# Patient Record
Sex: FEMALE | Race: WHITE | Employment: FULL TIME | URBAN - METROPOLITAN AREA
[De-identification: names, ages, dates, MRNs, and addresses within clinical notes are randomized per-mention and may not be internally consistent; named-entity substitution may affect disease eponyms.]

---

## 2022-04-12 ENCOUNTER — OFFICE VISIT (OUTPATIENT)
Dept: PRIMARY CARE CLINIC | Age: 32
End: 2022-04-12
Payer: COMMERCIAL

## 2022-04-12 VITALS
RESPIRATION RATE: 12 BRPM | BODY MASS INDEX: 25.23 KG/M2 | HEART RATE: 76 BPM | HEIGHT: 63 IN | OXYGEN SATURATION: 96 % | WEIGHT: 142.4 LBS | DIASTOLIC BLOOD PRESSURE: 72 MMHG | SYSTOLIC BLOOD PRESSURE: 100 MMHG

## 2022-04-12 DIAGNOSIS — E55.9 VITAMIN D DEFICIENCY: ICD-10-CM

## 2022-04-12 DIAGNOSIS — Z13.29 SCREENING FOR THYROID DISORDER: ICD-10-CM

## 2022-04-12 DIAGNOSIS — R14.0 ABDOMINAL BLOATING: ICD-10-CM

## 2022-04-12 DIAGNOSIS — Z76.89 ENCOUNTER TO ESTABLISH CARE: Primary | ICD-10-CM

## 2022-04-12 DIAGNOSIS — Z13.220 ENCOUNTER FOR LIPID SCREENING FOR CARDIOVASCULAR DISEASE: ICD-10-CM

## 2022-04-12 DIAGNOSIS — Z13.6 ENCOUNTER FOR LIPID SCREENING FOR CARDIOVASCULAR DISEASE: ICD-10-CM

## 2022-04-12 DIAGNOSIS — Z13.0 SCREENING FOR DEFICIENCY ANEMIA: ICD-10-CM

## 2022-04-12 DIAGNOSIS — Z12.4 CERVICAL CANCER SCREENING: ICD-10-CM

## 2022-04-12 DIAGNOSIS — E53.8 VITAMIN B 12 DEFICIENCY: ICD-10-CM

## 2022-04-12 PROCEDURE — 99203 OFFICE O/P NEW LOW 30 MIN: CPT | Performed by: NURSE PRACTITIONER

## 2022-04-12 RX ORDER — OMEPRAZOLE 40 MG/1
40 CAPSULE, DELAYED RELEASE ORAL
Qty: 90 CAPSULE | Refills: 1 | Status: SHIPPED | OUTPATIENT
Start: 2022-04-12 | End: 2022-05-10

## 2022-04-12 SDOH — ECONOMIC STABILITY: FOOD INSECURITY: WITHIN THE PAST 12 MONTHS, THE FOOD YOU BOUGHT JUST DIDN'T LAST AND YOU DIDN'T HAVE MONEY TO GET MORE.: NEVER TRUE

## 2022-04-12 SDOH — ECONOMIC STABILITY: FOOD INSECURITY: WITHIN THE PAST 12 MONTHS, YOU WORRIED THAT YOUR FOOD WOULD RUN OUT BEFORE YOU GOT MONEY TO BUY MORE.: NEVER TRUE

## 2022-04-12 ASSESSMENT — ENCOUNTER SYMPTOMS
VOMITING: 0
EYE ITCHING: 0
SINUS PRESSURE: 0
SHORTNESS OF BREATH: 0
ABDOMINAL PAIN: 0
COUGH: 0
EYE REDNESS: 0
SINUS PAIN: 0
SORE THROAT: 0
DIARRHEA: 0
TROUBLE SWALLOWING: 0
ABDOMINAL DISTENTION: 1
WHEEZING: 0
EYE DISCHARGE: 0
CHEST TIGHTNESS: 0
NAUSEA: 0

## 2022-04-12 ASSESSMENT — PATIENT HEALTH QUESTIONNAIRE - PHQ9
1. LITTLE INTEREST OR PLEASURE IN DOING THINGS: 0
SUM OF ALL RESPONSES TO PHQ9 QUESTIONS 1 & 2: 0
2. FEELING DOWN, DEPRESSED OR HOPELESS: 0
SUM OF ALL RESPONSES TO PHQ QUESTIONS 1-9: 0

## 2022-04-12 ASSESSMENT — SOCIAL DETERMINANTS OF HEALTH (SDOH): HOW HARD IS IT FOR YOU TO PAY FOR THE VERY BASICS LIKE FOOD, HOUSING, MEDICAL CARE, AND HEATING?: NOT HARD AT ALL

## 2022-04-12 NOTE — PROGRESS NOTES
[address] PRIMARY CARE  [address] [facility]  [address] [address]  Dept: [phone]  Dept Fax: [phone]    Celine Vincent is a 33 y.o. female who presents today for her medical conditions/complaintsas noted below. Celine Vincent is c/o of Establish Care and Bloated (noticed for a couple years)        HPI:     Pt presents to [facility] care. She has never had a pcp. bp stable  Weight is stable    Pt presents to establish care. She needs a gyn. No birth control. Cycle is regular. She is a . No children. Single. She has a dog, babatunde. C/o bloating that started a few years ago. She can not seem to figure it out. Sometimes its painful. Otherwise healthy. History reviewed. No pertinent past medical history. History reviewed. No pertinent surgical history. Family History   Problem Relation Age of Onset    Cancer Paternal Aunt        Social History     Tobacco Use    Smoking status: Never Smoker    Smokeless tobacco: Never Used   Substance Use Topics    Alcohol use: Not Currently      Current Outpatient Medications   Medication Sig Dispense Refill    omeprazole (PRILOSEC) 40 MG delayed release capsule Take 1 capsule by mouth every morning (before breakfast) 90 capsule 1     No current facility-administered medications for this visit.      Allergies   Allergen Reactions    Amoxicillin Nausea And Vomiting       Health Maintenance   Topic Date Due    Hepatitis C screen  Never done    Varicella vaccine (1 of 2 - 2-dose childhood series) Never done    HIV screen  Never done    Hepatitis B vaccine (3 of 3 - 3-dose primary series) 2008    Hepatitis A vaccine (2 of 2 - 2-dose series) 2009    DTaP/Tdap/Td vaccine (2 - Tdap) 07/10/2018    Cervical cancer screen  Never done    COVID-19 Vaccine (1) 2023 (Originally 1996)    Flu vaccine (Season Ended) 2022    Depression Screen  2023    Hib vaccine  Aged Out    Meningococcal (ACWY) vaccine  Aged Out    Pneumococcal 0-64 years Vaccine  Aged Out       :     Review of Systems   Constitutional: Negative for chills, fatigue and fever. HENT: Negative for ear discharge, ear pain, sinus pressure, sinus pain, sore throat and trouble swallowing. Eyes: Negative for discharge, redness and itching. Respiratory: Negative for cough, chest tightness, shortness of breath and wheezing. Cardiovascular: Negative for chest pain. Gastrointestinal: Positive for abdominal distention. Negative for abdominal pain, diarrhea, nausea and vomiting. Genitourinary: Negative for difficulty urinating. Musculoskeletal: Negative for arthralgias and neck pain. Skin: Negative for rash. Neurological: Negative for dizziness, weakness, light-headedness and headaches. All other systems reviewed and are negative. Objective:     Physical Exam  Constitutional:       Appearance: Normal appearance. She is normal weight. HENT:      Head: Normocephalic and atraumatic. Nose: Nose normal.   Eyes:      Extraocular Movements: Extraocular movements intact. Conjunctiva/sclera: Conjunctivae normal.      Pupils: Pupils are equal, round, and reactive to light. Cardiovascular:      Rate and Rhythm: Normal rate and regular rhythm. Pulses: Normal pulses. Heart sounds: Normal heart sounds. Pulmonary:      Effort: Pulmonary effort is normal.      Breath sounds: Normal breath sounds. Abdominal:      General: Abdomen is flat. Palpations: Abdomen is soft. Tenderness: There is abdominal tenderness. There is no guarding or rebound. Musculoskeletal:         General: Normal range of motion. Cervical back: Neck supple. Skin:     General: Skin is warm and dry. Capillary Refill: Capillary refill takes less than 2 seconds. Neurological:      General: No focal deficit present.       Mental Status: She is alert and oriented to person, place, and time.   Psychiatric:         Mood and Affect: Mood normal.       /72   Pulse 76   Resp 12   Ht 5' 2.75\" (1.594 m)   Wt 142 lb 6.4 oz (64.6 kg)   LMP 03/19/2022 (Exact Date)   SpO2 96%   Breastfeeding No   BMI 25.43 kg/m²     Assessment:       Diagnosis Orders   1. Encounter to establish care     2. Cervical cancer screening  [place], [place], [facility], [place]   3. Screening for deficiency anemia  CBC with Auto Differential   4. Screening for thyroid disorder  TSH with Reflex   5. Encounter for lipid screening for cardiovascular disease  Lipid Panel    Comprehensive Metabolic Panel   6. Vitamin B 12 deficiency  Vitamin B12 & Folate   7. Vitamin D deficiency  Vitamin D 25 Hydroxy   8. Abdominal bloating  Celiac Disease Panel    Food Comprehensive Panel       :      Return in about 1 month (around 5/12/2022) for physical.  1. Encounter to establish care  -reviewed previous records  2. Cervical cancer screening  -referral to [facility]  3. -7. Screening   Screening lab work ordered  8. Abdominal bloating  -rx for omeprazole 40mg daily    Follow up in one month for a physical   Orders Placed This Encounter   Procedures    CBC with Auto Differential     Standing Status:   Future     Standing Expiration Date:   4/12/2023    TSH with Reflex     Standing Status:   Future     Standing Expiration Date:   4/12/2023    Lipid Panel     Standing Status:   Future     Standing Expiration Date:   7/12/2022     Order Specific Question:   Is Patient Fasting?/# of Hours     Answer:    Fast 8-10 hours    Comprehensive Metabolic Panel     Standing Status:   Future     Standing Expiration Date:   4/12/2023    Vitamin B12 & Folate     Standing Status:   Future     Standing Expiration Date:   4/12/2023    Vitamin D 25 Hydroxy     Standing Status:   Future     Standing Expiration Date:   4/12/2023    Celiac Disease Panel     Standing Status:   Future     Standing Expiration Date:   4/12/2023   [facility] Comprehensive Panel     Standing Status:   Future     Standing Expiration Date:   4/12/2023   [facility] Posey, TATIANNA, Gynecology, [place]     Referral Priority:   Routine     Referral Type:   Eval and Treat     Referral Reason:   Specialty Services Required     Referred to Provider:   Zipporah Siemens, APRN - CNP     Requested Specialty:   Family Nurse Practitioner     Number of Visits Requested:   1     Orders Placed This Encounter   Medications    omeprazole (PRILOSEC) 40 MG delayed release capsule     Sig: Take 1 capsule by mouth every morning (before breakfast)     Dispense:  90 capsule     Refill:  1       Patient given educational materials - seepatient instructions. Discussed use, benefit, and side effects of prescribed medications. All patient questions answered. Pt voiced understanding. Reviewed health maintenance. Instructed to continue current medications, diet and exercise. Patient agreedwith treatment plan. Follow up as directed.       Electronically signed by KARLA Dai CNP on 4/12/2022at 4:15 PM

## 2022-05-10 ENCOUNTER — OFFICE VISIT (OUTPATIENT)
Dept: PRIMARY CARE CLINIC | Age: 32
End: 2022-05-10
Payer: COMMERCIAL

## 2022-05-10 VITALS
WEIGHT: 141 LBS | OXYGEN SATURATION: 99 % | HEART RATE: 86 BPM | HEIGHT: 63 IN | BODY MASS INDEX: 24.98 KG/M2 | SYSTOLIC BLOOD PRESSURE: 104 MMHG | DIASTOLIC BLOOD PRESSURE: 72 MMHG

## 2022-05-10 DIAGNOSIS — R14.0 ABDOMINAL BLOATING: ICD-10-CM

## 2022-05-10 DIAGNOSIS — E55.9 VITAMIN D DEFICIENCY: ICD-10-CM

## 2022-05-10 DIAGNOSIS — E53.8 VITAMIN B 12 DEFICIENCY: ICD-10-CM

## 2022-05-10 DIAGNOSIS — Z00.00 ENCOUNTER FOR WELL ADULT EXAM WITHOUT ABNORMAL FINDINGS: Primary | ICD-10-CM

## 2022-05-10 PROCEDURE — 99395 PREV VISIT EST AGE 18-39: CPT | Performed by: NURSE PRACTITIONER

## 2022-05-10 ASSESSMENT — PATIENT HEALTH QUESTIONNAIRE - PHQ9
SUM OF ALL RESPONSES TO PHQ QUESTIONS 1-9: 0
SUM OF ALL RESPONSES TO PHQ9 QUESTIONS 1 & 2: 0
1. LITTLE INTEREST OR PLEASURE IN DOING THINGS: 0
2. FEELING DOWN, DEPRESSED OR HOPELESS: 0
SUM OF ALL RESPONSES TO PHQ QUESTIONS 1-9: 0

## 2022-05-10 NOTE — LETTER
[facility] Primary Care  [address]  Phone: [phone]  Fax: [phone]    KARLA Kathleen CNP        May 10, 2022     Patient: Freddy Patten   YOB: 1990   Date of Visit: 5/10/2022       To Whom It May Concern: It is my medical opinion that Freddy Patten keep her dog as an emotional support animal to help with her mental health. .    If you have any questions or concerns, please don't hesitate to call.     Sincerely,        KARLA Kathleen CNP

## 2022-05-10 NOTE — PROGRESS NOTES
Well Adult Note  Name: Loly Ramos Date: 2022   MRN: [id number] Sex: Female   Age: 33 y.o. Ethnicity: Non- / Non    : 1990 Race: White (non-)      Andie Stapleton is here for well adult exam.  History:    Patient presents for her annual physical  Blood pressure stable  Weight is down 1 pound since last appointment    Patient presents for her annual physical.  Lab work was done prior to her visit. She continues to feel bloating despite omeprazole. She denies any change with taking the medication. She also would like to have a letter written for an emotional support animal    No other concerns today    She has an appointment with gynecology on May 18    Review of Systems   Constitutional: Negative for chills, fatigue and fever. HENT: Negative for ear discharge, ear pain, sinus pressure, sinus pain, sore throat and trouble swallowing. Eyes: Negative for discharge, redness and itching. Respiratory: Negative for cough, chest tightness, shortness of breath and wheezing. Cardiovascular: Negative for chest pain. Gastrointestinal: Positive for abdominal distention. Negative for abdominal pain, diarrhea, nausea and vomiting. Genitourinary: Negative for difficulty urinating. Musculoskeletal: Negative for arthralgias and neck pain. Skin: Negative for rash. Neurological: Negative for dizziness, weakness, light-headedness and headaches. All other systems reviewed and are negative. Allergies   Allergen Reactions    Amoxicillin Nausea And Vomiting         Prior to Visit Medications    Not on File       History reviewed. No pertinent past medical history. History reviewed. No pertinent surgical history.       Family History   Problem Relation Age of Onset    Cancer Paternal Aunt        Social History     Tobacco Use    Smoking status: Never Smoker    Smokeless tobacco: Never Used   Vaping Use    Vaping Use: Never used   Substance Use Topics    Alcohol use: Not Currently    Drug use: Never       Objective   /72   Pulse 86   Ht 5' 2.75\" (1.594 m)   Wt 141 lb (64 kg)   SpO2 99%   BMI 25.18 kg/m²   Wt Readings from Last 3 Encounters:   05/10/22 141 lb (64 kg)   04/12/22 142 lb 6.4 oz (64.6 kg)     There were no vitals filed for this visit. Physical Exam  Constitutional:       Appearance: Normal appearance. She is normal weight. HENT:      Head: Normocephalic and atraumatic. Nose: Nose normal.   Eyes:      Extraocular Movements: Extraocular movements intact. Conjunctiva/sclera: Conjunctivae normal.      Pupils: Pupils are equal, round, and reactive to light. Cardiovascular:      Rate and Rhythm: Normal rate and regular rhythm. Pulses: Normal pulses. Heart sounds: Normal heart sounds. Pulmonary:      Effort: Pulmonary effort is normal.      Breath sounds: Normal breath sounds. Abdominal:      General: Abdomen is flat. Palpations: Abdomen is soft. Musculoskeletal:         General: Normal range of motion. Cervical back: Neck supple. Skin:     General: Skin is warm and dry. Capillary Refill: Capillary refill takes less than 2 seconds. Neurological:      General: No focal deficit present. Mental Status: She is alert and oriented to person, place, and time. Psychiatric:         Mood and Affect: Mood normal.           Assessment   Plan   1. Encounter for well adult exam without abnormal findings  -Reviewed and discussed lab work  2. Abdominal bloating  -Stop omeprazole  -Discussed focusing on gut health. She is to look into doing an elimination diet to see if this helps. She is to continue to drink plenty of water and stay physically active  3. Vitamin B 12 deficiency  -Recommended either a multivitamin or a daily supplement  4. Vitamin D deficiency  -Recommend either multivitamin or daily supplement    She is to follow-up in 1 year or sooner as needed.   She is agreeable to this plan of care Personalized Preventive Plan   Current Health Maintenance Status  Immunization History   Administered Date(s) Administered    DTaP vaccine 07/10/2008    Hepatitis A Adult (Havrix, Vaqta) 07/17/2008    Hepatitis B Ped/Adol (Engerix-B, Recombivax HB) 06/10/2008, 07/10/2008    Influenza Virus Vaccine 12/09/2019    MMR 03/24/2004        Health Maintenance   Topic Date Due    Varicella vaccine (1 of 2 - 2-dose childhood series) Never done    HIV screen  Never done    Hepatitis B vaccine (3 of 3 - 3-dose primary series) 09/30/2008    Hepatitis A vaccine (2 of 2 - 2-dose series) 01/17/2009    Hepatitis C screen  Never done    DTaP/Tdap/Td vaccine (2 - Tdap) 07/10/2018    Cervical cancer screen  Never done    COVID-19 Vaccine (1) 04/12/2023 (Originally 4/5/1996)    Flu vaccine (Season Ended) 09/01/2022    Depression Screen  05/10/2023    Hib vaccine  Aged Out    Meningococcal (ACWY) vaccine  Aged Out    Pneumococcal 0-64 years Vaccine  Aged Out     Recommendations for Vinja Due: see orders and patient instructions/AVS.    Return in about 1 year (around 5/10/2023) for physical .

## 2022-05-11 PROBLEM — E53.8 VITAMIN B 12 DEFICIENCY: Status: ACTIVE | Noted: 2022-05-11

## 2022-05-11 PROBLEM — E55.9 VITAMIN D DEFICIENCY: Status: ACTIVE | Noted: 2022-05-11

## 2022-05-11 PROBLEM — R14.0 ABDOMINAL BLOATING: Status: ACTIVE | Noted: 2022-05-11

## 2022-05-11 ASSESSMENT — ENCOUNTER SYMPTOMS
CHEST TIGHTNESS: 0
SINUS PAIN: 0
ABDOMINAL DISTENTION: 1
EYE ITCHING: 0
WHEEZING: 0
EYE DISCHARGE: 0
ABDOMINAL PAIN: 0
VOMITING: 0
TROUBLE SWALLOWING: 0
EYE REDNESS: 0
NAUSEA: 0
SORE THROAT: 0
SINUS PRESSURE: 0
SHORTNESS OF BREATH: 0
COUGH: 0
DIARRHEA: 0

## 2022-06-28 ENCOUNTER — OFFICE VISIT (OUTPATIENT)
Dept: OBGYN CLINIC | Age: 32
End: 2022-06-28
Payer: COMMERCIAL

## 2022-06-28 VITALS — DIASTOLIC BLOOD PRESSURE: 62 MMHG | WEIGHT: 139 LBS | SYSTOLIC BLOOD PRESSURE: 116 MMHG | BODY MASS INDEX: 24.82 KG/M2

## 2022-06-28 DIAGNOSIS — Z01.419 VISIT FOR GYNECOLOGIC EXAMINATION: Primary | ICD-10-CM

## 2022-06-28 PROCEDURE — 99385 PREV VISIT NEW AGE 18-39: CPT | Performed by: NURSE PRACTITIONER

## 2022-06-28 ASSESSMENT — ENCOUNTER SYMPTOMS
VOMITING: 0
COLOR CHANGE: 0
COUGH: 0
ABDOMINAL PAIN: 0
SHORTNESS OF BREATH: 0
NAUSEA: 0

## 2022-06-28 NOTE — PROGRESS NOTES
Дмитрий Ortiz is a 33 y.o.  here for her annual exam.  The patient was seen and examined. The patients past medical, surgical, social and family history were reviewed. Current medications and allergies were reviewed, and documented in the chart. She is employed [employer]. G0    Tobacco abuse No    Last PAP: hx of abnormal PAP yes - May 2018 ASCUS, + HPV HR other ( that was her last PAP) [place] -CIN1,  she had previous colp in  CIN1/2- Had LEEP after this  Family hx uterine or ovarian cancer-denies   Family hx of breast cancer -paternal aunt  family hx colon cancer -denies        Sexually active: yes - for past year,  Dyspareunia: No, Vaginal discharge: no,  UTI symptoms: no, voiding difficulties: no, bowels regular:Yes bloating:no      Menstrual history:  Menarche age- 16, cycle every  month,  lasts 6 days. Birth control: NFP  LMP: 22    OB History    Para Term  AB Living   0 0 0 0 0 0   SAB IAB Ectopic Molar Multiple Live Births   0 0 0 0 0 0       Vitals:    22 0900   BP: 116/62   Site: Left Upper Arm   Position: Sitting   Cuff Size: Medium Adult   Weight: 139 lb (63 kg)       Wt Readings from Last 3 Encounters:   22 139 lb (63 kg)   05/10/22 141 lb (64 kg)   22 142 lb 6.4 oz (64.6 kg)     History reviewed. No pertinent past medical history.                                                                 Past Surgical History:   Procedure Laterality Date    LEEP       Family History   Problem Relation Age of Onset    Cancer Paternal Aunt     Breast Cancer Paternal Aunt      Social History     Tobacco Use   Smoking Status Never Smoker   Smokeless Tobacco Never Used     Social History     Substance and Sexual Activity   Alcohol Use Not Currently        Social History     Tobacco History     Smoking Status  Never Smoker    Smokeless Tobacco Use  Never Used          Alcohol History     Alcohol Use Status  Not Currently          Drug Use Drug Use Status  Never          Sexual Activity     Sexually Active  Yes Partners  Male Birth Control/Protection  None              Allergies   Allergen Reactions    Amoxicillin Nausea And Vomiting     No current outpatient medications on file. No current facility-administered medications for this visit. Subjective:     Review of Systems   Constitutional: Negative for chills and fever. Respiratory: Negative for cough and shortness of breath. Cardiovascular: Negative for chest pain, palpitations and leg swelling. Gastrointestinal: Negative for abdominal pain, nausea and vomiting. Genitourinary: Negative for dyspareunia, dysuria, flank pain, menstrual problem, pelvic pain, vaginal bleeding, vaginal discharge and vaginal pain. Skin: Negative for color change and rash. Neurological: Negative for dizziness, light-headedness and headaches. Hematological: Negative for adenopathy. Does not bruise/bleed easily. Psychiatric/Behavioral: Negative for self-injury and suicidal ideas. Objective:     Physical Exam  Vitals and nursing note reviewed. Constitutional:       General: She is not in acute distress. Appearance: She is well-developed. She is not diaphoretic. HENT:      Head: Normocephalic and atraumatic. Right Ear: External ear normal.      Left Ear: External ear normal.      Nose: Nose normal.   Eyes:      Pupils: Pupils are equal, round, and reactive to light. Neck:      Thyroid: No thyromegaly. Cardiovascular:      Rate and Rhythm: Normal rate and regular rhythm. Heart sounds: Normal heart sounds. No murmur heard. No friction rub. No gallop. Comments: No bilateral calf tenderness or swelling  Pulmonary:      Effort: Pulmonary effort is normal. No respiratory distress. Breath sounds: Normal breath sounds. No wheezing. Abdominal:      General: Bowel sounds are normal.      Palpations: Abdomen is soft. Tenderness:  There is no abdominal tenderness. Genitourinary:     Comments: Breasts nipples everted, no masses or tenderness, does BSE  Vulva-no lesions  Vagina-pink rugated  Cervix-firm, 2 cm. Nontender, freely movable, no lesions  Uterus-ant. Smooth, firm, nontender, freely movable  Adnexa-no masses or tenderness   Musculoskeletal:         General: Normal range of motion. Cervical back: Normal range of motion and neck supple. Lymphadenopathy:      Cervical: No cervical adenopathy. Skin:     General: Skin is warm and dry. Findings: No rash. Neurological:      Mental Status: She is alert and oriented to person, place, and time. Cranial Nerves: No cranial nerve deficit. Deep Tendon Reflexes: Reflexes are normal and symmetric. Psychiatric:         Behavior: Behavior normal.         Thought Content: Thought content normal.         Judgment: Judgment normal.       /62 (Site: Left Upper Arm, Position: Sitting, Cuff Size: Medium Adult)   Wt 139 lb (63 kg)   LMP 06/19/2022   Breastfeeding No   BMI 24.82 kg/m²     Assessment:       Diagnosis Orders   1. Visit for gynecologic examination  PAP SMEAR       Breast exam completed. Pelvic exam pap smear collected and sent. Cultures sent No    Plan:   Collect pap   BSE reviewed, Mammogram ordered: no  STD prevention reviewed  Gardisil counseling completed for all patients 10-37 yo  Cultures declined     Diet & Exercise reviewed with pt. Preventive  Health through PCP   RV prn/annual           Orders Placed This Encounter   Procedures    PAP SMEAR     Standing Status:   Future     Standing Expiration Date:   6/28/2023     Order Specific Question:   Collection Type     Answer: Thin Prep     Order Specific Question:   Prior Abnormal Pap Test     Answer:   No     Order Specific Question:   Screening or Diagnostic     Answer:   Screening     Order Specific Question:   HPV Requested?      Answer:   Yes     Order Specific Question:   High Risk Patient     Answer:   N/A     No orders of the defined types were placed in this encounter. Patient given educational materials - seepatient instructions. Discussed use, benefit, and side effects of prescribed medications. All patient questions answered. Pt voiced understanding. Reviewed health maintenance. Instructed to continue current medications, diet and exercise. Patient agreedwith treatment plan. Follow up as directed.       Electronically signed by KARLA Boss CNP on 6/28/2022at 9:51 AM

## 2022-06-28 NOTE — PATIENT INSTRUCTIONS
Patient Education        Pap Test: Care Instructions  Overview     The Pap test (also called a Pap smear) is a screening test for cancer of the cervix, which is the lower part of the uterus that opens into the vagina. The test can help your doctor find early changes in the cells that could lead tocancer. The sample of cells taken during your test has been sent to a lab so that an expert can look at the cells. It usually takes a week or two to get the resultsback. Follow-up care is a key part of your treatment and safety. Be sure to make and go to all appointments, and call your doctor if you are having problems. It's also a good idea to know your test results and keep alist of the medicines you take. What do the results mean?  A normal result means that the test did not find any abnormal cells in the sample.  An abnormal result can mean many things. Most of these are not cancer. The results of your test may be abnormal because:  ? You have an infection of the vagina or cervix, such as a yeast infection. ? You have low estrogen levels after menopause that are causing the cells to change. ? You have cell changes that may be a sign of precancer or cancer. The results are ranked based on how serious the changes might be. If the results were abnormal, you may need to have other tests. If the results show changes that could be a sign of cancer, you may need a test called acolposcopy, which provides a more complete view of the cervix. Sometimes the lab cannot use the sample because it does not contain enough cells or was not preserved well. If so, you may need to have the test again. This is not common, but it does happen from time to time. When should you call for help?   Watch closely for changes in your health, and be sure to contact your doctor if:     You have vaginal bleeding or pain for more than 2 days after the test. It is normal to have a small amount of bleeding for a day or two after the test. Where can you learn more? Go to https://chpepiceweb.healthIM-Sense. org and sign in to your Bringme account. Enter G998 in the deviantART box to learn more about \"Pap Test: Care Instructions. \"     If you do not have an account, please click on the \"Sign Up Now\" link. Current as of: September 8, 2021               Content Version: 13.3  © 6492-3546 Healthwise, Incorporated. Care instructions adapted under license by [facility] ([facility]). If you have questions about a medical condition or this instruction, always ask your healthcare professional. [address] any warranty or liability for your use of this information.

## 2022-07-18 DIAGNOSIS — Z01.419 VISIT FOR GYNECOLOGIC EXAMINATION: ICD-10-CM

## 2023-03-07 ENCOUNTER — OFFICE VISIT (OUTPATIENT)
Dept: OBGYN CLINIC | Age: 33
End: 2023-03-07
Payer: COMMERCIAL

## 2023-03-07 ENCOUNTER — HOSPITAL ENCOUNTER (OUTPATIENT)
Age: 33
Setting detail: SPECIMEN
Discharge: HOME | End: 2023-03-07

## 2023-03-07 VITALS
WEIGHT: 130 LBS | BODY MASS INDEX: 23.04 KG/M2 | DIASTOLIC BLOOD PRESSURE: 70 MMHG | SYSTOLIC BLOOD PRESSURE: 110 MMHG | HEIGHT: 63 IN

## 2023-03-07 DIAGNOSIS — L70.0 ACNE VULGARIS: ICD-10-CM

## 2023-03-07 DIAGNOSIS — N92.3 INTERMENSTRUAL BLEEDING: ICD-10-CM

## 2023-03-07 DIAGNOSIS — L68.0 HIRSUTISM: ICD-10-CM

## 2023-03-07 DIAGNOSIS — N94.6 DYSMENORRHEA: Primary | ICD-10-CM

## 2023-03-07 LAB
CANDIDA SPECIES, DNA PROBE: NEGATIVE
GARDNERELLA VAGINALIS, DNA PROBE: NEGATIVE
SOURCE: NORMAL
TRICHOMONAS VAGINALIS DNA: NEGATIVE

## 2023-03-07 PROCEDURE — 99214 OFFICE O/P EST MOD 30 MIN: CPT | Performed by: ADVANCED PRACTICE MIDWIFE

## 2023-03-07 ASSESSMENT — ENCOUNTER SYMPTOMS
DIARRHEA: 0
SHORTNESS OF BREATH: 0
ABDOMINAL PAIN: 0
NAUSEA: 0
VOMITING: 0

## 2023-03-07 ASSESSMENT — PATIENT HEALTH QUESTIONNAIRE - PHQ9
1. LITTLE INTEREST OR PLEASURE IN DOING THINGS: 0
SUM OF ALL RESPONSES TO PHQ QUESTIONS 1-9: 0
2. FEELING DOWN, DEPRESSED OR HOPELESS: 0
SUM OF ALL RESPONSES TO PHQ9 QUESTIONS 1 & 2: 0
SUM OF ALL RESPONSES TO PHQ QUESTIONS 1-9: 0

## 2023-03-07 NOTE — PROGRESS NOTES
[address] OB/GYN [facility]  [place]  [address] [address]  Dept: [phone]    Patient Name: Mardel Bence  Patient Age: 33 y.o. Date of Visit: 3/7/2023    Subjective  Chief Complaint   Patient presents with    Other     Possible PCOS- excess pain in lower back, cramping, spotting on and off, acne, facial hair    Got off of BC a couple years ago  Symtpoms started appearing worse over the last 5-6 months     Patient's last menstrual period was 2023. Chaperone for Intimate Exam  Chaperone was offered as part of the rooming process. Patient declined and agrees to continue with exam without a chaperone. Chaperone: n/a      History of Present Illness:    Patient arrive with menstrual concerns including new onset pain and intermenstrual bleeding   Patient reports hirsutism. Patient reports acne. Patient denies alopecia  Patient denies acanthosis nigricans     Began having periods at age 16 have them every month. Age [age] until the age of 30 was on oral contraception. Went on birth control at age of [age] due to acne. When she initially stopped taking her birth control her periods remained monthly and were not painful. About a year after she stopped she started to notice more headache, cramping, hormonal acne, hair growth in face. More recently pain first day or two of period almost 'worthless' can't do anything and then spotting intermenstrual    Cycles around 33 days    Chronic health conditions no    Sexually with men (one) using natural family planning, condoms, pull out     No history of STDs    OB History          0    Para   0    Term   0       0    AB   0    Living   0         SAB   0    IAB   0    Ectopic   0    Molar   0    Multiple   0    Live Births   0              History reviewed. No pertinent past medical history. No current outpatient medications on file.      No current facility-administered medications for this visit. Review of Systems   Constitutional:  Negative for unexpected weight change. Respiratory:  Negative for shortness of breath. Cardiovascular:  Negative for chest pain, palpitations and leg swelling. Gastrointestinal:  Negative for abdominal pain, diarrhea, nausea and vomiting. Genitourinary:  Positive for menstrual problem and vaginal discharge. Negative for difficulty urinating, dyspareunia and vaginal pain. Neurological:  Negative for dizziness, light-headedness and headaches. Objective  /70 (Site: Right Upper Arm, Position: Sitting, Cuff Size: Small Adult)   Ht 5' 3\" (1.6 m)   Wt 130 lb (59 kg)   LMP 02/17/2023   BMI 23.03 kg/m²   Body mass index is 23.03 kg/m². PCOS Lab review:   Bioavailable and free testosterone: Discussed, Ordered  17 hydroxyprogesterone: Discussed, Ordered  TSH:Discussed, Ordered  Prolactin:Discussed, Ordered  Fasting Lipids:Discussed, Ordered  Transvaginal/pelvic ultrasound:Discussed, Ordered  Fasting insulin:Discussed, Ordered    Waist Circumference (>35in abnormal): no    [place] Consensus Criteria 2003   1. Hyperandrogenism: Yes   2. Oligomenorrhea or Amenorrhea: No   3. Polycystic ovaries by ultrasound diagnosis: No    Physical Exam  Vitals reviewed. Constitutional:       General: She is not in acute distress. Appearance: She is well-developed. She is not diaphoretic. Neck:      Thyroid: No thyromegaly or thyroid tenderness. Cardiovascular:      Rate and Rhythm: Normal rate and regular rhythm. Heart sounds: Normal heart sounds. Pulmonary:      Effort: Pulmonary effort is normal. No respiratory distress. Breath sounds: Normal breath sounds. Abdominal:      General: There is no distension. Palpations: Abdomen is soft. Tenderness: There is no abdominal tenderness. Genitourinary:     Labia:         Right: No rash, tenderness or lesion. Left: No rash, tenderness or lesion.        Vagina: No signs of injury. Vaginal discharge present. No tenderness. Cervix: Friability present. No cervical motion tenderness or lesion. Musculoskeletal:         General: Normal range of motion. Cervical back: Normal range of motion. Skin:     General: Skin is warm and dry. Neurological:      Mental Status: She is alert and oriented to person, place, and time. Mental status is at baseline. Psychiatric:         Behavior: Behavior normal.         Thought Content: Thought content normal.         Judgment: Judgment normal.         Assessment & Plan  1. Dysmenorrhea  Discussed patient's menstrual history in depth, reviewed based on age of menses and prolonged period of time on combined oral contraception dysmenorrhea could not be a new finding but hard to deduce because combined oral contraception aids in painful menstrual cycles. - Reviewed comfort measures including heat/ice/NSAID  - Reviewed expectant management verus medical management. Would like to obtained labs/imaging and then will decide management plan  - Testosterone Free and Total, Non-Male; Future  - 17-Hydroxyprogesterone; Future  - TSH; Future  - Prolactin; Future  - Hemoglobin A1C; Future  - Insulin, total; Future  - Lipid Panel; Future  - US PELVIS COMPLETE NON-OB TRANSABDOMINAL AND TRANSVAGINAL; Future    2. Acne vulgaris  - Discussed combined oral contraception can help with acne so being on birth control could have masked this symptoms.   - Testosterone Free and Total, Non-Male; Future  - 17-Hydroxyprogesterone; Future  - TSH; Future  - Prolactin; Future  - Hemoglobin A1C; Future  - Insulin, total; Future  - Lipid Panel; Future  - US PELVIS COMPLETE NON-OB TRANSABDOMINAL AND TRANSVAGINAL; Future    3. Hirsutism  - Testosterone Free and Total, Non-Male; Future  - 17-Hydroxyprogesterone; Future  - TSH; Future  - Prolactin; Future  - Hemoglobin A1C; Future  - Insulin, total; Future  - Lipid Panel;  Future  - US PELVIS COMPLETE NON-OB TRANSABDOMINAL AND TRANSVAGINAL; Future    4. Intermenstrual bleeding  - Discussed etiology. NO structural causes on exam noted   - Testosterone Free and Total, Non-Male; Future  - 17-Hydroxyprogesterone; Future  - TSH; Future  - Prolactin; Future  - Hemoglobin A1C; Future  - Insulin, total; Future  - Lipid Panel; Future  - US PELVIS COMPLETE NON-OB TRANSABDOMINAL AND TRANSVAGINAL; Future  - Vaginitis DNA Probe; Future  - C.trachomatis N.gonorrhoeae DNA; Future    - Reviewed with Ankur Farmer this is no universally accepted definition of polycystic ovary syndrome.  - Reviewed [place] Consensus Criteria 2003 (two out of three required for diagnosis) is used by provider.  - Reviewed hyperandrogenism is diagnosed by the presence of hirsutism, acne and/or biochemical hyperandrogenemia. - Reviewed that polycystic ovaries alone are a very nonspecific findings and as an isolated finding is not diagnostic for polycystic ovarian syndrome.  - Reviewed that women with polycystic ovarian syndrome are at increased risk of insulin resistance, metabolic syndrome, nonalcoholic fatty liver disease, and obesity-related disorders including sleep apnea. - Discussed with Ankur Farmer that women with polycystic ovarian syndrome have multiple risk factors for endometrial cancer including chronic anovulation, centripetal obesity, and diabetes. - Discussed that women with polycystic ovarian syndrome has anywhere from two to fivefold increased risk of diabetes. Discussed recommendations for screening for type 2 diabetes including 2-hour glucola. Discussed that fasting glucose levels alone are poor predictors of glucose intolerance risk in women with polycystic ovarian syndrome.   - Discussed women with polycystic ovarian syndrome have an increased risk of cardiovascular disease. Reviewed recommendation for monitoring BMI and fasting lipids.   - Reviewed that regular exercise and weight control are proven to reduce mortality and morbidity.     Reviewed based on current symptoms and history would NOT meet criteria for PCOS. Need ultrasound to further evaluate     Return if symptoms worsen or fail to improve.     Electronically Signed KARLA Damian CNM

## 2023-03-08 LAB
C TRACH DNA SPEC QL PROBE+SIG AMP: NEGATIVE
N GONORRHOEA DNA SPEC QL PROBE+SIG AMP: NEGATIVE
SPECIMEN DESCRIPTION: NORMAL

## 2023-07-18 LAB
AVERAGE GLUCOSE: 88 MG/DL
CHOLESTEROL/HDL RATIO: 3.3 RATIO
CHOLESTEROL: 177 MG/DL
HBA1C MFR BLD: 4.7 % (ref 4.2–5.6)
HDLC SERPL-MCNC: 54 MG/DL
INSULIN: 6 UIU/ML (ref 2–21)
LDL CHOLESTEROL CALCULATED: 111 MG/DL
LDL/HDL RATIO: 2.1 RATIO
PROLACTIN: 7.1 NG/ML (ref 5–37)
TRIGL SERPL-MCNC: 62 MG/DL
TSH SERPL DL<=0.05 MIU/L-ACNC: 0.98 UIU/ML (ref 0.4–4.1)
VLDLC SERPL CALC-MCNC: 12 MG/DL

## 2023-07-21 LAB — 17-HYDROXYPROGESTERONE: 1.3 NG/ML

## 2023-07-22 LAB
ALBUMIN SERUM: 4.5 G/DL (ref 3.6–5.1)
SEX HORMONE BINDING GLOBULIN: 64.2 NMOL/L (ref 24.6–122)
TESTOSTERONE FREE: 2.4 PG/ML (ref 1.3–9.2)
TESTOSTERONE, LCMS: 21 NG/DL (ref 9–55)

## 2023-07-25 ENCOUNTER — TELEPHONE (OUTPATIENT)
Dept: OBGYN CLINIC | Age: 33
End: 2023-07-25

## 2023-07-25 NOTE — TELEPHONE ENCOUNTER
Pt notified of results/ recommendations. Pt states her pain has improved since her last visit and she would like to hold of as of now. States she will call the office if anything changes.

## 2023-07-25 NOTE — TELEPHONE ENCOUNTER
----- Message from [facility], KARLA - CNM sent at 7/25/2023  1:06 PM EDT -----  Results reviewed. Overall unremarkable. Labs ordered for PCOS work up. USN completed 3/12/23 ovarian volume does NOT support PCOS.  At this time low suspicion for PCOS can offer appointment if desires to continue to discuss

## 2024-07-30 ENCOUNTER — OFFICE VISIT (OUTPATIENT)
Dept: FAMILY MEDICINE CLINIC | Age: 34
End: 2024-07-30
Payer: COMMERCIAL

## 2024-07-30 VITALS
TEMPERATURE: 98 F | RESPIRATION RATE: 14 BRPM | SYSTOLIC BLOOD PRESSURE: 116 MMHG | DIASTOLIC BLOOD PRESSURE: 62 MMHG | HEART RATE: 93 BPM | OXYGEN SATURATION: 98 %

## 2024-07-30 DIAGNOSIS — J02.9 SORE THROAT: ICD-10-CM

## 2024-07-30 DIAGNOSIS — J32.4 CHRONIC PANSINUSITIS: ICD-10-CM

## 2024-07-30 DIAGNOSIS — J02.0 ACUTE STREPTOCOCCAL PHARYNGITIS: Primary | ICD-10-CM

## 2024-07-30 LAB
STREP PYOGENES DNA, POC: POSITIVE
VALID INTERNAL CONTROL, POC: NORMAL

## 2024-07-30 PROCEDURE — 99213 OFFICE O/P EST LOW 20 MIN: CPT

## 2024-07-30 PROCEDURE — 87651 STREP A DNA AMP PROBE: CPT

## 2024-07-30 RX ORDER — CEFUROXIME AXETIL 250 MG/1
250 TABLET ORAL 2 TIMES DAILY
Qty: 20 TABLET | Refills: 0 | Status: SHIPPED | OUTPATIENT
Start: 2024-07-30 | End: 2024-08-09

## 2024-07-30 ASSESSMENT — ENCOUNTER SYMPTOMS
RECTAL PAIN: 0
SORE THROAT: 1
SINUS PRESSURE: 0
RHINORRHEA: 0
EYES NEGATIVE: 1
CHOKING: 0
COLOR CHANGE: 0
SINUS PAIN: 0
ABDOMINAL PAIN: 0
COUGH: 1
EYE DISCHARGE: 0
BACK PAIN: 0
TROUBLE SWALLOWING: 0
VISUAL CHANGE: 0
FACIAL SWELLING: 0
EYE ITCHING: 0
CONSTIPATION: 0
APNEA: 0
PHOTOPHOBIA: 0
NAUSEA: 0
DIARRHEA: 0
CHANGE IN BOWEL HABIT: 0
BLOOD IN STOOL: 0
SHORTNESS OF BREATH: 0
STRIDOR: 0
EYE REDNESS: 0
CHEST TIGHTNESS: 0
VOMITING: 0
VOICE CHANGE: 0
EYE PAIN: 0
ANAL BLEEDING: 0
GASTROINTESTINAL NEGATIVE: 1
WHEEZING: 0
SWOLLEN GLANDS: 0
ABDOMINAL DISTENTION: 0

## 2024-07-30 NOTE — PATIENT INSTRUCTIONS
Patient Education        Strep Throat: Care Instructions  Overview     Strep throat is a bacterial infection that causes sudden, severe sore throat and fever. Symptoms may include red, swollen tonsils that may have white or yellow patches. A strep test may be needed to tell if the sore throat is caused by strep bacteria. Treatment can help ease symptoms and may prevent future problems.  Follow-up care is a key part of your treatment and safety. Be sure to make and go to all appointments, and call your doctor if you are having problems. It's also a good idea to know your test results and keep a list of the medicines you take.  How can you care for yourself at home?  Take your antibiotics as directed. Do not stop taking them just because you feel better. You need to take the full course of antibiotics.  Strep throat can spread to others until 24 hours after you begin taking antibiotics. During this time, avoid contact with other people at work, school, or home, especially infants and children. Do not sneeze or cough on others, and wash your hands often. Keep your drinking glass and eating utensils separate from those of others. Wash these items well in hot, soapy water.  Gargle with warm salt water several times a day to help reduce swelling and relieve pain. Mix 1/2 teaspoon of salt in 1 cup of warm water.  Take an over-the-counter pain medication, such as acetaminophen (Tylenol), ibuprofen (Advil, Motrin), or naproxen (Aleve). Read and follow all instructions on the label.  Try an over-the-counter anesthetic throat spray or throat lozenges, which may help relieve throat pain.  Drink plenty of fluids. Fluids may help soothe an irritated throat. Hot fluids, such as tea or soup, may help your throat feel better.  Eat soft solids and drink plenty of liquids. Flavored ice pops, ice cream, scrambled eggs, sherbet, and gelatin dessert (such as Jell-O) may also soothe the throat.  Get lots of rest.  If you smoke, try to

## 2024-07-30 NOTE — PROGRESS NOTES
[facility]  [address] [place] [address]    [facility]  [address]  [facility] [address]  Dept: [phone]    Carlota Steiner is a 34 y.o. female Established patient, who presents to the walk-in clinic today with conditions/complaints as noted below:    Chief Complaint   Patient presents with    Congestion     On and off x 1 month    Pharyngitis     Throat and tongue are discolored     Ear Fullness     Bilateral          HPI:     Pharyngitis  This is a new problem. The current episode started 1 to 4 weeks ago. The problem occurs intermittently. The problem has been waxing and waning. Associated symptoms include congestion, coughing, headaches and a sore throat. Pertinent negatives include no abdominal pain, anorexia, arthralgias, change in bowel habit, chest pain, chills, diaphoresis, fatigue, fever, joint swelling, myalgias, nausea, neck pain, numbness, rash, swollen glands, urinary symptoms, vertigo, visual change, vomiting or weakness. The symptoms are aggravated by swallowing. Treatments tried: Doxycycline. The treatment provided no relief.       History reviewed. No pertinent past medical history.    Current Outpatient Medications   Medication Sig Dispense Refill    cefUROXime (CEFTIN) 250 MG tablet Take 1 tablet by mouth 2 times daily for 10 days 20 tablet 0     No current facility-administered medications for this visit.       Allergies   Allergen Reactions    Amoxicillin Nausea And Vomiting       Review of Systems:     Review of Systems   Constitutional: Negative.  Negative for activity change, appetite change, chills, diaphoresis, fatigue, fever and unexpected weight change.   HENT:  Positive for congestion and sore throat. Negative for dental problem, drooling, ear discharge, ear pain, facial swelling, hearing loss, mouth sores,